# Patient Record
Sex: FEMALE | Race: BLACK OR AFRICAN AMERICAN | NOT HISPANIC OR LATINO | Employment: FULL TIME | URBAN - METROPOLITAN AREA
[De-identification: names, ages, dates, MRNs, and addresses within clinical notes are randomized per-mention and may not be internally consistent; named-entity substitution may affect disease eponyms.]

---

## 2020-02-10 ENCOUNTER — OFFICE VISIT (OUTPATIENT)
Dept: URGENT CARE | Facility: CLINIC | Age: 55
End: 2020-02-10
Payer: MEDICAID

## 2020-02-10 VITALS
SYSTOLIC BLOOD PRESSURE: 134 MMHG | WEIGHT: 243 LBS | OXYGEN SATURATION: 95 % | RESPIRATION RATE: 26 BRPM | DIASTOLIC BLOOD PRESSURE: 92 MMHG | TEMPERATURE: 98.1 F | HEART RATE: 110 BPM

## 2020-02-10 DIAGNOSIS — K58.9 IRRITABLE BOWEL SYNDROME, UNSPECIFIED TYPE: ICD-10-CM

## 2020-02-10 DIAGNOSIS — J45.20 MILD INTERMITTENT ASTHMA, UNSPECIFIED WHETHER COMPLICATED: ICD-10-CM

## 2020-02-10 DIAGNOSIS — B37.0 ORAL THRUSH: ICD-10-CM

## 2020-02-10 PROCEDURE — 99204 OFFICE O/P NEW MOD 45 MIN: CPT | Mod: 25 | Performed by: FAMILY MEDICINE

## 2020-02-10 PROCEDURE — 94640 AIRWAY INHALATION TREATMENT: CPT | Performed by: FAMILY MEDICINE

## 2020-02-10 RX ORDER — ALBUTEROL SULFATE 2.5 MG/3ML
2.5 SOLUTION RESPIRATORY (INHALATION) EVERY 6 HOURS PRN
Qty: 90 BULLET | Refills: 1 | Status: SHIPPED | OUTPATIENT
Start: 2020-02-10 | End: 2020-02-10

## 2020-02-10 RX ORDER — ALBUTEROL SULFATE 2.5 MG/3ML
2.5 SOLUTION RESPIRATORY (INHALATION) EVERY 6 HOURS PRN
Qty: 90 BULLET | Refills: 3 | Status: SHIPPED | OUTPATIENT
Start: 2020-02-10 | End: 2020-03-11

## 2020-02-10 RX ORDER — DICYCLOMINE HYDROCHLORIDE 10 MG/1
10 CAPSULE ORAL 3 TIMES DAILY
Qty: 90 CAP | Refills: 3 | Status: SHIPPED | OUTPATIENT
Start: 2020-02-10 | End: 2021-10-28 | Stop reason: SDUPTHER

## 2020-02-10 RX ORDER — DICYCLOMINE HYDROCHLORIDE 10 MG/1
CAPSULE ORAL
COMMUNITY
Start: 2019-11-25 | End: 2020-02-10 | Stop reason: SDUPTHER

## 2020-02-10 RX ORDER — ALBUTEROL SULFATE 2.5 MG/3ML
2.5 SOLUTION RESPIRATORY (INHALATION) ONCE
Status: COMPLETED | OUTPATIENT
Start: 2020-02-10 | End: 2020-02-10

## 2020-02-10 RX ORDER — DICYCLOMINE HYDROCHLORIDE 10 MG/1
10 CAPSULE ORAL 3 TIMES DAILY
Qty: 90 CAP | Refills: 1 | Status: SHIPPED | OUTPATIENT
Start: 2020-02-10 | End: 2020-02-10

## 2020-02-10 RX ORDER — ALBUTEROL SULFATE 2.5 MG/3ML
2.5 SOLUTION RESPIRATORY (INHALATION) EVERY 4 HOURS PRN
COMMUNITY
End: 2020-02-10 | Stop reason: SDUPTHER

## 2020-02-10 RX ADMIN — ALBUTEROL SULFATE 2.5 MG: 2.5 SOLUTION RESPIRATORY (INHALATION) at 17:26

## 2020-02-10 ASSESSMENT — ENCOUNTER SYMPTOMS
SHORTNESS OF BREATH: 0
FEVER: 0
NAUSEA: 0
VOMITING: 0
CHILLS: 0
EYE REDNESS: 0
MYALGIAS: 0
SORE THROAT: 0
DIZZINESS: 0

## 2020-02-11 NOTE — PROGRESS NOTES
Subjective:   Violetta Rodriguez is a 54 y.o. female who presents for Asthma and Medication Refill (nebulizer,nebulizer solution and dicyclomine)        54-year-old female with history of asthma presents to the urgent care with chief complaint of dyspnea and wheezing.  The patient has a history of mild persistent asthma and has been using a nebulizer twice a day on a chronic basis.  The patient recently relocated to the area and is in between primary care providers.  The patient states this morning the nebulizer machine attachment broke and the patient was unable to use the nebulizer and albuterol this morning.  Patient denies fevers chills or sweats, denies cough.  The patient has a history of a bowel syndrome and has been taking dicyclomine on a chronic basis and is requesting a refill.  Patient denies abdominal pain denies nausea or vomiting or loose stools at this time.  The patient complains of white exudative rash on the tongue which is worse with using the nebulizer.    Asthma   There is no shortness of breath. Pertinent negatives include no chest pain, fever, myalgias or sore throat. Her past medical history is significant for asthma.     PMH:  has no past medical history on file.  MEDS:   Current Outpatient Medications:   •  albuterol (PROVENTIL) 2.5mg/3ml Nebu Soln solution for nebulization, 3 mL by Nebulization route every 6 hours as needed for Shortness of Breath for up to 30 days., Disp: 90 Bullet, Rfl: 3  •  dicyclomine (BENTYL) 10 MG Cap, Take 1 Cap by mouth 3 times a day., Disp: 90 Cap, Rfl: 3  •  Respiratory Therapy Supplies (NEBULIZER) Device, 1 Units by Does not apply route every 6 hours., Disp: 1 Device, Rfl: 0  •  nystatin (MYCOSTATIN) 795904 UNIT/ML Suspension, Take 5 mL by mouth 4 times a day for 7 days., Disp: 140 mL, Rfl: 0  ALLERGIES:   Allergies   Allergen Reactions   • Amoxicillin Swelling   • Sulfa Drugs Swelling     SURGHX: No past surgical history on file.  SOCHX:  reports that she has never  smoked. She has never used smokeless tobacco.  FH: Family history was reviewed  Review of Systems   Constitutional: Negative for chills and fever.   HENT: Negative for sore throat.    Eyes: Negative for redness.   Respiratory: Negative for shortness of breath.    Cardiovascular: Negative for chest pain.   Gastrointestinal: Negative for nausea and vomiting.   Genitourinary: Negative for dysuria.   Musculoskeletal: Negative for myalgias.   Skin: Negative for rash.   Neurological: Negative for dizziness.   All other systems reviewed and are negative.       Objective:   /92 (BP Location: Left arm)   Pulse (!) 110   Temp 36.7 °C (98.1 °F) (Temporal)   Resp (!) 26   Wt 110.2 kg (243 lb)   LMP  (Exact Date)   SpO2 95%   Physical Exam  Vitals signs and nursing note reviewed.   Constitutional:       General: She is not in acute distress.     Appearance: She is well-developed.   HENT:      Head: Normocephalic and atraumatic.      Right Ear: External ear normal.      Left Ear: External ear normal.      Nose: Nose normal.      Mouth/Throat:      Mouth: Mucous membranes are moist.      Pharynx: Oropharynx is clear.     Eyes:      Conjunctiva/sclera: Conjunctivae normal.      Pupils: Pupils are equal, round, and reactive to light.   Cardiovascular:      Rate and Rhythm: Normal rate and regular rhythm.      Heart sounds: No murmur.   Pulmonary:      Effort: Pulmonary effort is normal. No tachypnea, prolonged expiration or respiratory distress.      Breath sounds: No decreased air movement. Wheezing (mild) present. No decreased breath sounds or rhonchi.   Abdominal:      General: There is no distension.      Palpations: Abdomen is soft.      Tenderness: There is no tenderness.   Musculoskeletal: Normal range of motion.   Skin:     General: Skin is warm and dry.   Neurological:      General: No focal deficit present.      Mental Status: She is alert and oriented to person, place, and time. Mental status is at baseline.         Gait: Gait (gait at baseline) normal.   Psychiatric:         Judgment: Judgment normal.     Medical decision-making/course: The patient remained afebrile, hemodynamically and neurologically stable with no evidence of respiratory compromise throughout the urgent care course.  Patient was given albuterol via hand-held nebulizer once with symptomatic improvement and decreased subjective dyspnea for the patient.  There was no immediate clinical indication for the necessity of emergency department evaluation or inpatient admission and the patient requested a trial of outpatient management.          Assessment/Plan:   1. Mild intermittent asthma, unspecified whether complicated  - albuterol (PROVENTIL) 2.5mg/3ml nebulizer solution 2.5 mg  - albuterol (PROVENTIL) 2.5mg/3ml Nebu Soln solution for nebulization; 3 mL by Nebulization route every 6 hours as needed for Shortness of Breath for up to 30 days.  Dispense: 90 Bullet; Refill: 3  - DME Nebulizer  - Respiratory Therapy Supplies (NEBULIZER) Device; 1 Units by Does not apply route every 6 hours.  Dispense: 1 Device; Refill: 0    2. Irritable bowel syndrome, unspecified type  - dicyclomine (BENTYL) 10 MG Cap; Take 1 Cap by mouth 3 times a day.  Dispense: 90 Cap; Refill: 3    3. Oral thrush  - nystatin (MYCOSTATIN) 958448 UNIT/ML Suspension; Take 5 mL by mouth 4 times a day for 7 days.  Dispense: 140 mL; Refill: 0    Advised patient to follow-up with her primary care provider for recheck reevaluation consideration of further management.    Discussed close monitoring, return precautions, and supportive measures including maintaining adequate fluid hydration and caloric intake, relative rest and OTC symptom management including acetaminophen as needed for pain and/or fever.    Differential diagnosis, natural history, supportive care, and indications for immediate follow-up discussed.     Advised the patient to follow-up with the primary care physician for recheck,  reevaluation, and consideration of further management.

## 2020-02-11 NOTE — PATIENT INSTRUCTIONS
Asthma, Acute Bronchospasm  Acute bronchospasm caused by asthma is also referred to as an asthma attack. Bronchospasm means your air passages become narrowed. The narrowing is caused by inflammation and tightening of the muscles in the air tubes (bronchi) in your lungs. This can make it hard to breathe or cause you to wheeze and cough.  What are the causes?  Possible triggers are:  · Animal dander from the skin, hair, or feathers of animals.  · Dust mites contained in house dust.  · Cockroaches.  · Pollen from trees or grass.  · Mold.  · Cigarette or tobacco smoke.  · Air pollutants such as dust, household , hair sprays, aerosol sprays, paint fumes, strong chemicals, or strong odors.  · Cold air or weather changes. Cold air may trigger inflammation. Winds increase molds and pollens in the air.  · Strong emotions such as crying or laughing hard.  · Stress.  · Certain medicines such as aspirin or beta-blockers.  · Sulfites in foods and drinks, such as dried fruits and wine.  · Infections or inflammatory conditions, such as a flu, cold, or inflammation of the nasal membranes (rhinitis).  · Gastroesophageal reflux disease (GERD). GERD is a condition where stomach acid backs up into your esophagus.  · Exercise or strenuous activity.  What are the signs or symptoms?  · Wheezing.  · Excessive coughing, particularly at night.  · Chest tightness.  · Shortness of breath.  How is this diagnosed?  Your health care provider will ask you about your medical history and perform a physical exam. A chest X-ray or blood testing may be performed to look for other causes of your symptoms or other conditions that may have triggered your asthma attack.  How is this treated?  Treatment is aimed at reducing inflammation and opening up the airways in your lungs. Most asthma attacks are treated with inhaled medicines. These include quick relief or rescue medicines (such as bronchodilators) and controller medicines (such as inhaled  corticosteroids). These medicines are sometimes given through an inhaler or a nebulizer. Systemic steroid medicine taken by mouth or given through an IV tube also can be used to reduce the inflammation when an attack is moderate or severe. Antibiotic medicines are only used if a bacterial infection is present.  Follow these instructions at home:  · Rest.  · Drink plenty of liquids. This helps the mucus to remain thin and be easily coughed up. Only use caffeine in moderation and do not use alcohol until you have recovered from your illness.  · Do not smoke. Avoid being exposed to secondhand smoke.  · You play a critical role in keeping yourself in good health. Avoid exposure to things that cause you to wheeze or to have breathing problems.  · Keep your medicines up-to-date and available. Carefully follow your health care provider’s treatment plan.  · Take your medicine exactly as prescribed.  · When pollen or pollution is bad, keep windows closed and use an air conditioner or go to places with air conditioning.  · Asthma requires careful medical care. See your health care provider for a follow-up as advised. If you are more than 24 weeks pregnant and you were prescribed any new medicines, let your obstetrician know about the visit and how you are doing. Follow up with your health care provider as directed.  · After you have recovered from your asthma attack, make an appointment with your outpatient doctor to talk about ways to reduce the likelihood of future attacks. If you do not have a doctor who manages your asthma, make an appointment with a primary care doctor to discuss your asthma.  Get help right away if:  · You are getting worse.  · You have trouble breathing. If severe, call your local emergency services (911 in the [place]  · You develop chest pain or discomfort.  · You are vomiting.  · You are not able to keep fluids down.  · You are coughing up yellow, green, brown, or bloody sputum.  · You have a fever  and your symptoms suddenly get worse.  · You have trouble swallowing.  This information is not intended to replace advice given to you by your health care provider. Make sure you discuss any questions you have with your health care provider.  Document Released: 04/03/2008 Document Revised: 05/31/2017 Document Reviewed: 06/25/2014  [employer] Interactive Patient Education © 2017 [employer] Inc.  Oral Thrush, Adult  Oral thrush is an infection in your mouth and throat. It causes white patches on your tongue and in your mouth.  Follow these instructions at home:  Helping with soreness  · To lessen your pain:  ¨ Drink cold liquids, like water and iced tea.  ¨ Eat frozen ice pops or frozen juices.  ¨ Eat foods that are easy to swallow, like gelatin and ice cream.  ¨ Drink from a straw if the patches in your mouth are painful.  General instructions  · Take or use over-the-counter and prescription medicines only as told by your doctor. Medicine for oral thrush may be something to swallow, or it may be something to put on the infected area.  · Eat plain yogurt that has live cultures in it. Read the label to make sure.  · If you wear dentures:  ¨ Take out your dentures before you go to bed.  ¨ Brush them well.  ¨ Soak them in a denture .  · Rinse your mouth with warm salt-water many times a day. To make the salt-water mixture, completely dissolve 1/2-1 teaspoon of salt in 1 cup of warm water.  Contact a doctor if:  · Your problems are getting worse.  · Your problems do not get better in less than 7 days with treatment.  · Your infection is spreading. This may show as white patches on the skin outside of your mouth.  · You are nursing your baby and you have redness and pain in the nipples.  This information is not intended to replace advice given to you by your health care provider. Make sure you discuss any questions you have with your health care provider.  Document Released: 03/14/2011 Document Revised: 09/11/2017  Document Reviewed: 09/11/2017  [employer] Interactive Patient Education © 2017 Elsevier Inc.

## 2021-10-28 ENCOUNTER — OFFICE VISIT (OUTPATIENT)
Dept: MEDICAL GROUP | Facility: CLINIC | Age: 56
End: 2021-10-28
Payer: MEDICAID

## 2021-10-28 VITALS
DIASTOLIC BLOOD PRESSURE: 92 MMHG | BODY MASS INDEX: 41.76 KG/M2 | SYSTOLIC BLOOD PRESSURE: 149 MMHG | HEART RATE: 83 BPM | WEIGHT: 235.7 LBS | OXYGEN SATURATION: 94 % | HEIGHT: 63 IN

## 2021-10-28 DIAGNOSIS — J30.9 ALLERGIC RHINITIS, UNSPECIFIED SEASONALITY, UNSPECIFIED TRIGGER: ICD-10-CM

## 2021-10-28 DIAGNOSIS — Z56.89 DIFFICULTY PERFORMING WORK ACTIVITIES: ICD-10-CM

## 2021-10-28 DIAGNOSIS — K58.8 OTHER IRRITABLE BOWEL SYNDROME: ICD-10-CM

## 2021-10-28 DIAGNOSIS — K58.9 IRRITABLE BOWEL SYNDROME, UNSPECIFIED TYPE: ICD-10-CM

## 2021-10-28 PROCEDURE — 99213 OFFICE O/P EST LOW 20 MIN: CPT | Mod: GE | Performed by: STUDENT IN AN ORGANIZED HEALTH CARE EDUCATION/TRAINING PROGRAM

## 2021-10-28 RX ORDER — DICYCLOMINE HYDROCHLORIDE 10 MG/1
10 CAPSULE ORAL 3 TIMES DAILY
Qty: 90 CAPSULE | Refills: 3 | Status: SHIPPED | OUTPATIENT
Start: 2021-10-28 | End: 2022-03-23 | Stop reason: SDUPTHER

## 2021-10-28 RX ORDER — FLUTICASONE PROPIONATE 50 MCG
1 SPRAY, SUSPENSION (ML) NASAL DAILY
COMMUNITY
End: 2021-10-28 | Stop reason: SDUPTHER

## 2021-10-28 RX ORDER — FLUTICASONE PROPIONATE 50 MCG
1 SPRAY, SUSPENSION (ML) NASAL DAILY
Qty: 16 G | Refills: 12 | Status: SHIPPED | OUTPATIENT
Start: 2021-10-28 | End: 2022-07-06 | Stop reason: SDUPTHER

## 2021-10-28 RX ORDER — DOCUSATE SODIUM 100 MG/1
CAPSULE, LIQUID FILLED ORAL
COMMUNITY
Start: 2021-10-23 | End: 2021-10-28 | Stop reason: SDUPTHER

## 2021-10-28 RX ORDER — DOCUSATE SODIUM 100 MG/1
100 CAPSULE, LIQUID FILLED ORAL 2 TIMES DAILY PRN
Qty: 60 CAPSULE | Refills: 12 | Status: SHIPPED | OUTPATIENT
Start: 2021-10-28 | End: 2022-07-06

## 2021-10-28 NOTE — LETTER
[facility]     October 28, 2021    Patient: Violetta Rodriguez   YOB: 1965   Date of Visit: 10/28/2021       To Whom It May Concern:    Violetta Rodriguez was seen and treated in our clinic on 10/28/2021. The patient requires a leave of absence due to knee pain that severely limits her ability to perform work duties, particularly standing. She is actively working on a solution to this problem, and will return when able.     Sincerely,     Christina Philip M.D.

## 2021-10-28 NOTE — PROGRESS NOTES
"Subjective:     CC: Difficulties performing work duties, knee pain, obesity the above chief complaint.    HPI:   Violetta presents today with the above chief complaint.    Problem   Difficulty Performing Work Activities    Works at [employer]. Has difficulty standing for her entire shift (9 hrs). Limited by knee pain. Previously seen by me for this. Has seen an orthopedic surgeon; BMI too high for knee replacement surgery. Must lose 20 lb prior to surgery. Requesting time off from work to work on weight loss and work on obtaining disability through outside provider. Looks forward to dietary changes, bike and treadmill. Has done extensive PT in past, and very much enjoys the exercise bike. Gets steroid injections twice yearly with orthopedics; due again in December of this year.      Ibs (Irritable Bowel Syndrome)    Present x several years. Requests refill of dicyclomine and docusate for this. Hx of good control with symptoms with this.      Allergic Rhinitis    Requests fluticasone refill for this.      Bmi 40.0-44.9, Adult ([facility])    See \"difficultly performing work duties.\"         Current Outpatient Medications Ordered in Epic   Medication Sig Dispense Refill   • dicyclomine (BENTYL) 10 MG Cap Take 1 Capsule by mouth 3 times a day. 90 Capsule 3   • docusate sodium (COLACE) 100 MG Cap Take 1 Capsule by mouth 2 times a day as needed for Constipation. 60 Capsule 12   • fluticasone (FLONASE) 50 MCG/ACT nasal spray Administer 1 Spray into affected nostril(S) every day. 16 g 12   • Respiratory Therapy Supplies (NEBULIZER) Device 1 Units by Does not apply route every 6 hours. 1 Device 0     No current Epic-ordered facility-administered medications on file.       Health Maintenance: not completed; will f/u @ next visit in 2-3 months    ROS:  Gen: no fevers/chills, no changes in weight  Eyes: no changes in vision  ENT: no sore throat, no hearing loss, no bloody nose  Pulm: no sob, no cough  CV: no chest pain, no palpitations  GI: " "no nausea/vomiting, no diarrhea  : no dysuria  MSk: no myalgias  Skin: no rash  Neuro: no headaches, no numbness/tingling  Heme/Lymph: no easy bruising      Objective:     Exam:  /92 (BP Location: Left arm, Patient Position: Sitting, BP Cuff Size: Adult)   Pulse 83   Ht 1.588 m (5' 2.5\")   Wt 107 kg (235 lb 11.2 oz)   SpO2 94%   BMI 42.42 kg/m²  Body mass index is 42.42 kg/m².    Gen: Alert and oriented, No apparent distress.  Obese.  Neck: Neck is supple without lymphadenopathy.  Lungs: Normal effort, CTA bilaterally, no wheezes, rhonchi, or rales  CV: Regular rate and rhythm. No murmurs, rubs, or gallops.  Ext: No clubbing, cyanosis, edema.  Bilateral knees with crepitus with any movement, as well as visible bony enlargement diffusely.      Labs: None    Assessment & Plan:     56 y.o. female with the following -     Problem List Items Addressed This Visit     Difficulty performing work activities     Work note provided today. RTC in 2-3 months to review symptoms and progress with weight loss.          Relevant Orders    CBC WITH DIFFERENTIAL    IBS (irritable bowel syndrome)     Refills provided today.          Relevant Medications    dicyclomine (BENTYL) 10 MG Cap    Other Relevant Orders    CBC WITH DIFFERENTIAL    Allergic rhinitis    Relevant Medications    fluticasone (FLONASE) 50 MCG/ACT nasal spray    BMI 40.0-44.9, adult (HCC)     F/u 2-3 months. Labs ordered today.          Relevant Orders    HEMOGLOBIN A1C    CHOLESTEROL    Comp Metabolic Panel    CBC WITH DIFFERENTIAL              No follow-ups on file.    Please note that this dictation was created using voice recognition software. I have made every reasonable attempt to correct obvious errors, but I expect that there are errors of grammar and possibly content that I did not discover before finalizing the note.        "

## 2021-10-28 NOTE — LETTER
[facility]     October 28, 2021    Patient: Violetta Rodriguez   YOB: 1965   Date of Visit: 10/28/2021       To Whom It May Concern:     Violetta Rodriguez was seen and treated in our clinic on 10/28/2021. The patient requires a leave of absence due to knee pain caused by bilateral, severe osteoarthritis of her knees with sclerosis and osteophytes that severely limits her ability to perform work duties, particularly standing. She is actively working on a solution to this problem, and will return when able.     Sincerely,     Christina Philip M.D.

## 2021-11-05 ENCOUNTER — OFFICE VISIT (OUTPATIENT)
Dept: MEDICAL GROUP | Facility: CLINIC | Age: 56
End: 2021-11-05
Payer: MEDICAID

## 2021-11-05 VITALS
HEIGHT: 63 IN | SYSTOLIC BLOOD PRESSURE: 130 MMHG | HEART RATE: 102 BPM | DIASTOLIC BLOOD PRESSURE: 84 MMHG | TEMPERATURE: 98 F | OXYGEN SATURATION: 95 % | WEIGHT: 236 LBS | BODY MASS INDEX: 41.82 KG/M2 | RESPIRATION RATE: 18 BRPM

## 2021-11-05 DIAGNOSIS — Z56.89 DIFFICULTY PERFORMING WORK ACTIVITIES: ICD-10-CM

## 2021-11-05 DIAGNOSIS — M17.10 ARTHRITIS OF KNEE: ICD-10-CM

## 2021-11-05 PROCEDURE — 7101 PR PHYSICAL: Mod: GE | Performed by: STUDENT IN AN ORGANIZED HEALTH CARE EDUCATION/TRAINING PROGRAM

## 2021-11-05 RX ORDER — IBUPROFEN 200 MG
400 TABLET ORAL EVERY 6 HOURS PRN
COMMUNITY

## 2021-11-05 RX ORDER — MELOXICAM 7.5 MG/1
TABLET ORAL
COMMUNITY
Start: 2021-10-24 | End: 2022-07-06

## 2021-11-05 RX ORDER — ESTRADIOL 0.1 MG/G
CREAM VAGINAL
COMMUNITY
Start: 2021-10-09 | End: 2022-02-19

## 2021-11-05 ASSESSMENT — PAIN SCALES - GENERAL: PAINLEVEL: 4=SLIGHT-MODERATE PAIN

## 2021-11-05 ASSESSMENT — PATIENT HEALTH QUESTIONNAIRE - PHQ9: CLINICAL INTERPRETATION OF PHQ2 SCORE: 0

## 2021-11-06 NOTE — ASSESSMENT & PLAN NOTE
FMLA paperwork signed  -Educated patient on weight loss to assist with symptoms  -Follow-up with [facility] orthopedics for further evaluation of knee replacement surgery

## 2021-11-06 NOTE — PROGRESS NOTES
[facility]    Chief Complaint   Patient presents with   • Follow-Up     Short term disability paperwork       HISTORY OF PRESENT ILLNESS: Patient is a 56 y.o. female established patient who presents today to discuss the medical issues below:    Problem   Arthritis of Knee    Per documentation provided by [facility] orthopedics, patient has bilateral osteoarthritis of the knees.   -Patient complains of severe knee pain with prolonged standing and walking.  -She currently uses a walker to assist.     Difficulty Performing Work Activities    Works at [employer]. Has difficulty standing for her entire shift (9 hrs). Limited by knee pain. Previously seen by me for this. Has seen an orthopedic surgeon; BMI too high for knee replacement surgery. Must lose 20 lb prior to surgery. Requesting time off from work to work on weight loss and work on obtaining disability through outside provider. Looks forward to dietary changes, bike and treadmill. Has done extensive PT in past, and very much enjoys the exercise bike. Gets steroid injections twice yearly with orthopedics; due again in December of this year.           Patient Active Problem List    Diagnosis Date Noted   • Arthritis of knee 11/05/2021   • Difficulty performing work activities 10/28/2021   • IBS (irritable bowel syndrome) 10/28/2021   • Allergic rhinitis 10/28/2021   • BMI 40.0-44.9, adult ([facility]) 10/28/2021       Allergies:Amoxicillin and Sulfa drugs    Current Outpatient Medications   Medication Sig Dispense Refill   • ibuprofen (MOTRIN) 200 MG Tab Take 400 mg by mouth every 6 hours as needed.     • estradiol (ESTRACE) 0.1 MG/GM vaginal cream      • meloxicam (MOBIC) 7.5 MG Tab      • dicyclomine (BENTYL) 10 MG Cap Take 1 Capsule by mouth 3 times a day. 90 Capsule 3   • docusate sodium (COLACE) 100 MG Cap Take 1 Capsule by mouth 2 times a day as needed for Constipation. 60 Capsule 12   • fluticasone (FLONASE) 50 MCG/ACT nasal spray Administer 1 Spray into  "affected nostril(S) every day. 16 g 12   • Respiratory Therapy Supplies (NEBULIZER) Device 1 Units by Does not apply route every 6 hours. 1 Device 0     No current facility-administered medications for this visit.         No past medical history on file.    Social History     Tobacco Use   • Smoking status: Never Smoker   • Smokeless tobacco: Never Used   Vaping Use   • Vaping Use: Never used   Substance Use Topics   • Alcohol use: Not on file   • Drug use: Not on file       No family status information on file.   No family history on file.    ROS:  Negative except as stated above.      Exam:   /84 (BP Location: Left arm, Patient Position: Sitting, BP Cuff Size: Adult)   Pulse (!) 102   Temp 36.7 °C (98 °F) (Temporal)   Resp 18   Ht 1.6 m (5' 3\")   Wt 107 kg (236 lb)   SpO2 95%  Body mass index is 41.81 kg/m².  General:  Well nourished, well developed female in NAD.  HENT: Normocephalic  Pulmonary: Clear to ausculation.  Normal effort. No rales, rhonchi, or wheezing.  Cardiovascular: Regular rate and rhythm without murmur.   Abdomen: Normal bowel sounds, soft and nontender, no palpable liver, spleen, or masses.  Extremities: No LE edema noted. 5/5 strength in all extremities.  Tenderness palpation of bilateral knees.  Crepitus noted with flexion and extension of bilateral knees.  Neuro: Grossly nonfocal.  Psych: Alert and oriented to person, place, and time. Appropriate mood and conversation.            Assessment/Plan:  Difficulty performing work activities  Patient presents today for [facility] paperwork to be signed.  -[facility] paperwork filled out  -Follow-up with orthopedic surgery  -Follow-up with Dr. Philip in 1 month    Arthritis of knee  [facility] paperwork signed  -Educated patient on weight loss to assist with symptoms  -Follow-up with [facility] orthopedics for further evaluation of knee replacement surgery           Jose Solitario,   R   PGY-2    "

## 2021-11-06 NOTE — ASSESSMENT & PLAN NOTE
Patient presents today for [facility] paperwork to be signed.  -[facility] paperwork filled out  -Follow-up with orthopedic surgery  -Follow-up with Dr. Philip in 1 month

## 2021-12-06 ENCOUNTER — TELEPHONE (OUTPATIENT)
Dept: MEDICAL GROUP | Facility: CLINIC | Age: 56
End: 2021-12-06

## 2021-12-06 NOTE — TELEPHONE ENCOUNTER
Unclear exactly which practice this is. Most importantly, does she require ophthalmology or optometry referral?    Caller Name: Violetta  Call Back Number: [phone] (home) [phone] (work)      How would the patient prefer to be contacted with a response: [employer] message    Patient called asking for a referral to [facility] for dry eye.

## 2021-12-14 ENCOUNTER — TELEPHONE (OUTPATIENT)
Dept: SCHEDULING | Facility: IMAGING CENTER | Age: 56
End: 2021-12-14

## 2021-12-14 DIAGNOSIS — H04.129 DRY EYE: ICD-10-CM

## 2021-12-14 NOTE — TELEPHONE ENCOUNTER
I talked to pt and she needs referral to opthalmology so that she can go to [facility]. She talked with Medicaid and they need the referral to be ophthalmology not optometry. Their fax is [phone]. I will fax the referral to them once you place it.

## 2022-01-03 ENCOUNTER — APPOINTMENT (OUTPATIENT)
Dept: MEDICAL GROUP | Facility: CLINIC | Age: 57
End: 2022-01-03
Payer: MEDICAID

## 2022-01-11 ENCOUNTER — HOSPITAL ENCOUNTER (OUTPATIENT)
Dept: RADIOLOGY | Facility: MEDICAL CENTER | Age: 57
End: 2022-01-11
Attending: STUDENT IN AN ORGANIZED HEALTH CARE EDUCATION/TRAINING PROGRAM
Payer: MEDICAID

## 2022-01-11 ENCOUNTER — OFFICE VISIT (OUTPATIENT)
Dept: MEDICAL GROUP | Facility: CLINIC | Age: 57
End: 2022-01-11

## 2022-01-11 ENCOUNTER — APPOINTMENT (OUTPATIENT)
Dept: LAB | Facility: MEDICAL CENTER | Age: 57
End: 2022-01-11
Payer: MEDICAID

## 2022-01-11 ENCOUNTER — APPOINTMENT (OUTPATIENT)
Dept: RADIOLOGY | Facility: CLINIC | Age: 57
End: 2022-01-11
Attending: STUDENT IN AN ORGANIZED HEALTH CARE EDUCATION/TRAINING PROGRAM
Payer: MEDICAID

## 2022-01-11 VITALS
WEIGHT: 221 LBS | OXYGEN SATURATION: 94 % | DIASTOLIC BLOOD PRESSURE: 93 MMHG | HEIGHT: 63 IN | BODY MASS INDEX: 39.16 KG/M2 | SYSTOLIC BLOOD PRESSURE: 147 MMHG | TEMPERATURE: 97 F | HEART RATE: 80 BPM | RESPIRATION RATE: 18 BRPM

## 2022-01-11 DIAGNOSIS — M17.12 ARTHRITIS OF LEFT KNEE: ICD-10-CM

## 2022-01-11 PROCEDURE — 93000 ELECTROCARDIOGRAM COMPLETE: CPT | Mod: GC | Performed by: STUDENT IN AN ORGANIZED HEALTH CARE EDUCATION/TRAINING PROGRAM

## 2022-01-11 PROCEDURE — 71046 X-RAY EXAM CHEST 2 VIEWS: CPT

## 2022-01-11 PROCEDURE — 99213 OFFICE O/P EST LOW 20 MIN: CPT | Mod: GE | Performed by: STUDENT IN AN ORGANIZED HEALTH CARE EDUCATION/TRAINING PROGRAM

## 2022-01-11 ASSESSMENT — PATIENT HEALTH QUESTIONNAIRE - PHQ9: CLINICAL INTERPRETATION OF PHQ2 SCORE: 0

## 2022-01-11 NOTE — ASSESSMENT & PLAN NOTE
I have performed an EKG and the patient in clinic today, which appears normal. Sinus rhythm, asymptomatic, , QRS 92, QTc 425, normal axis, no concerning ST or t wave changes.     I have also provided the patient with lab slips for the lab work as requested by her orthopedic surgeon.  Review of lab work performed shows PT, PTT within normal limits.  Complete metabolic panel is within normal limits.  CBC is generally within normal limits, though the patient does have low normal hemoglobin with decreased MCH and MCHC values, as well as mild elevation of platelets.  Hemoglobin A1c is 5.9%.    No h/o concerning pulmonary history, no cough, no dyspnea, no sputum. No h/o chronic lung disease. No chest xray necessary prior to clearance.     She is medically cleared from my standpoint for surgery.     Dr Vela fax Attkaryna Dejesus: [phone]

## 2022-01-13 DIAGNOSIS — R93.89 OPACITY NOTED ON IMAGING STUDY: ICD-10-CM

## 2022-01-13 NOTE — TELEPHONE ENCOUNTER
Looks like a referral to ophthalmology was placed. I called and lvm with pt to cb if she has any questions or concerns

## 2022-01-18 NOTE — PROGRESS NOTES
"Subjective:     CC: Preoperative clearance for left knee total arthroplasty February 15, 2022    HPI:   Violetta presents today with the above chief complaint    Problem   Arthritis of Left Knee    Patient has been seen by Dr. Vela for arthritis of her knees with plans for left knee total arthroplasty February 15, 2022.  He has requested a letter of medical clearance for the patient, as well as EKG and lab work (CBC, CMP, PT, PTT, A1c) to be performed in advance of her procedure.         Current Outpatient Medications Ordered in Epic   Medication Sig Dispense Refill   • estradiol (ESTRACE) 0.1 MG/GM vaginal cream      • meloxicam (MOBIC) 7.5 MG Tab      • ibuprofen (MOTRIN) 200 MG Tab Take 400 mg by mouth every 6 hours as needed.     • dicyclomine (BENTYL) 10 MG Cap Take 1 Capsule by mouth 3 times a day. 90 Capsule 3   • docusate sodium (COLACE) 100 MG Cap Take 1 Capsule by mouth 2 times a day as needed for Constipation. 60 Capsule 12   • fluticasone (FLONASE) 50 MCG/ACT nasal spray Administer 1 Spray into affected nostril(S) every day. 16 g 12   • Respiratory Therapy Supplies (NEBULIZER) Device 1 Units by Does not apply route every 6 hours. 1 Device 0     No current Epic-ordered facility-administered medications on file.       Health Maintenance: Deferred    ROS:  Gen: no fevers/chills; does report intentional weight loss recently, and is pleased about this.  Has lost weight through exercise (stationary bicycle).  Eyes: no changes in vision  ENT: no sore throat, no hearing loss, no bloody nose  Pulm: no sob, no cough  CV: no chest pain, no palpitations  GI: no nausea/vomiting, no diarrhea  : no dysuria  MSk: Chronic bilateral knee pain secondary to arthritis  Skin: no rash  Neuro: no headaches, no numbness/tingling  Heme/Lymph: no easy bruising      Objective:     Exam:  /93   Pulse 80   Temp 36.1 °C (97 °F)   Resp 18   Ht 1.6 m (5' 3\")   Wt 100 kg (221 lb)   SpO2 94%   BMI 39.15 kg/m²  Body mass index " is 39.15 kg/m².    Gen: Alert and oriented, No apparent distress.  Obese.  Neck: Neck is supple without lymphadenopathy.  Lungs: Normal effort, CTA bilaterally, no wheezes, rhonchi, or rales  CV: Regular rate and rhythm. No murmurs, rubs, or gallops.  Ext: No clubbing, cyanosis, edema.        Assessment & Plan:     56 y.o. female with the following -     Problem List Items Addressed This Visit     Arthritis of left knee     I have performed an EKG and the patient in clinic today, which appears normal. Sinus rhythm, asymptomatic, , QRS 92, QTc 425, normal axis, no concerning ST or t wave changes.     I have also provided the patient with lab slips for the lab work as requested by her orthopedic surgeon. **    No h/o concerning pulmonary history, no cough, no dyspnea, no sputum. No h/o chronic lung disease. No chest xray necessary prior to clearance.     She is medically cleared from my standpoint for surgery.     Dr Vela fax Attn Anjelica: [phone]         Relevant Orders    DX-CHEST-2 VIEWS (Completed)      Other Visit Diagnoses     BMI 39.0-39.9,adult        Relevant Orders    CBC WITH DIFFERENTIAL    Comp Metabolic Panel    APTT    Prothrombin Time    HEMOGLOBIN A1C    EKG - Clinic Performed (Completed)    DX-CHEST-2 VIEWS (Completed)              No follow-ups on file.

## 2022-01-28 ENCOUNTER — HOSPITAL ENCOUNTER (OUTPATIENT)
Dept: RADIOLOGY | Facility: MEDICAL CENTER | Age: 57
End: 2022-01-28
Attending: STUDENT IN AN ORGANIZED HEALTH CARE EDUCATION/TRAINING PROGRAM
Payer: MEDICAID

## 2022-01-28 DIAGNOSIS — R93.89 OPACITY NOTED ON IMAGING STUDY: ICD-10-CM

## 2022-01-28 PROCEDURE — 71260 CT THORAX DX C+: CPT

## 2022-01-28 PROCEDURE — 700117 HCHG RX CONTRAST REV CODE 255: Performed by: STUDENT IN AN ORGANIZED HEALTH CARE EDUCATION/TRAINING PROGRAM

## 2022-01-28 RX ADMIN — IOHEXOL 75 ML: 350 INJECTION, SOLUTION INTRAVENOUS at 10:14

## 2022-02-02 ENCOUNTER — TELEPHONE (OUTPATIENT)
Dept: MEDICAL GROUP | Facility: CLINIC | Age: 57
End: 2022-02-02

## 2022-02-02 NOTE — TELEPHONE ENCOUNTER
Phone Number Called: [phone] (home)       Call outcome: Spoke to patient regarding message below.    Message: Called patient regarding her imaging results, she would like to know if this is going to interfere with her upcoming surgery with Dr. Vela. She would also like to know if we should be faxing any of this information over to him    Thank you!

## 2022-02-02 NOTE — TELEPHONE ENCOUNTER
Please disregard the message below, I read your other note stating this should not affect her surgery

## 2022-02-07 ENCOUNTER — HOME HEALTH ADMISSION (OUTPATIENT)
Dept: HOME HEALTH SERVICES | Facility: HOME HEALTHCARE | Age: 57
End: 2022-02-07
Payer: MEDICAID

## 2022-02-19 ENCOUNTER — HOME CARE VISIT (OUTPATIENT)
Dept: HOME HEALTH SERVICES | Facility: HOME HEALTHCARE | Age: 57
End: 2022-02-19
Payer: MEDICAID

## 2022-02-19 PROCEDURE — G0493 RN CARE EA 15 MIN HH/HOSPICE: HCPCS

## 2022-02-19 PROCEDURE — 665001 SOC-HOME HEALTH

## 2022-02-19 ASSESSMENT — ENCOUNTER SYMPTOMS
SUBJECTIVE PAIN PROGRESSION: GRADUALLY IMPROVING
HIGHEST PAIN SEVERITY IN PAST 24 HOURS: 6/10
PAIN LOCATION - RELIEVING FACTORS: RESTING, PAIN PILL
PAIN SEVERITY GOAL: 0/10
PAIN LOCATION: LEFT KNEE
PAIN LOCATION - PAIN FREQUENCY: CONSTANT
PAIN: 1
PERSON REPORTING PAIN: PATIENT
PAIN LOCATION - EXACERBATING FACTORS: WALKING
LOWEST PAIN SEVERITY IN PAST 24 HOURS: 6/10
PAIN LOCATION - PAIN QUALITY: ACHY
PAIN LOCATION - PAIN SEVERITY: 6/10

## 2022-02-19 ASSESSMENT — PAIN SCALES - PAIN ASSESSMENT IN ADVANCED DEMENTIA (PAINAD)
FACIALEXPRESSION: 0 - SMILING OR INEXPRESSIVE.
NEGVOCALIZATION: 0 - NONE.
CONSOLABILITY: 0 - NO NEED TO CONSOLE.

## 2022-02-20 VITALS
TEMPERATURE: 97.6 F | WEIGHT: 220 LBS | HEIGHT: 63 IN | RESPIRATION RATE: 19 BRPM | DIASTOLIC BLOOD PRESSURE: 98 MMHG | BODY MASS INDEX: 38.98 KG/M2 | OXYGEN SATURATION: 98 % | SYSTOLIC BLOOD PRESSURE: 136 MMHG | HEART RATE: 106 BPM

## 2022-02-20 ASSESSMENT — ENCOUNTER SYMPTOMS: MENTAL STATUS CHANGE: 0

## 2022-02-20 ASSESSMENT — PATIENT HEALTH QUESTIONNAIRE - PHQ9
2. FEELING DOWN, DEPRESSED, IRRITABLE, OR HOPELESS: 00
1. LITTLE INTEREST OR PLEASURE IN DOING THINGS: 00
CLINICAL INTERPRETATION OF PHQ2 SCORE: 0

## 2022-02-20 ASSESSMENT — PAIN SCALES - PAIN ASSESSMENT IN ADVANCED DEMENTIA (PAINAD)
TOTALSCORE: 1
BODYLANGUAGE: 1 - TENSE. DISTRESSED PACING. FIDGETING.

## 2022-02-20 NOTE — CASE COMMUNICATION
for review please  Admitted to [facility] onn2/19/2022 s.p L knee arthoplasty  /98( checked 2x), hr 106, we will cont to monitor and notify you

## 2022-02-20 NOTE — CASE COMMUNICATION
admitted 2/19/2022 to home care [id number].  sn 1 wk1, 2 wk2, 1 wk2  she is scheduled to start out patient PT pn 3/07/2022 at [facility]

## 2022-02-22 ENCOUNTER — DOCUMENTATION (OUTPATIENT)
Dept: VASCULAR LAB | Facility: MEDICAL CENTER | Age: 57
End: 2022-02-22
Payer: MEDICAID

## 2022-02-22 ENCOUNTER — HOME CARE VISIT (OUTPATIENT)
Dept: HOME HEALTH SERVICES | Facility: HOME HEALTHCARE | Age: 57
End: 2022-02-22
Payer: MEDICAID

## 2022-02-22 VITALS
SYSTOLIC BLOOD PRESSURE: 130 MMHG | RESPIRATION RATE: 19 BRPM | DIASTOLIC BLOOD PRESSURE: 75 MMHG | TEMPERATURE: 97.9 F | OXYGEN SATURATION: 95 % | HEART RATE: 104 BPM

## 2022-02-22 PROCEDURE — G0299 HHS/HOSPICE OF RN EA 15 MIN: HCPCS

## 2022-02-22 PROCEDURE — G0151 HHCP-SERV OF PT,EA 15 MIN: HCPCS

## 2022-02-22 SDOH — ECONOMIC STABILITY: HOUSING INSECURITY: HOME SAFETY: RECOMMENDED GRAB BARS IN SHOWER  PATIENT HAS A SHOWER CHAIR AND 3 IN 1 COMMODE

## 2022-02-22 ASSESSMENT — ACTIVITIES OF DAILY LIVING (ADL)
AMBULATION_DISTANCE/DURATION_TOLERATED: 80 FEET
AMBULATION ASSISTANCE ON FLAT SURFACES: 1

## 2022-02-22 ASSESSMENT — ENCOUNTER SYMPTOMS
SUBJECTIVE PAIN PROGRESSION: GRADUALLY IMPROVING
PAIN LOCATION - PAIN QUALITY: ACHY, SHARP
HIGHEST PAIN SEVERITY IN PAST 24 HOURS: 6/10
PAIN LOCATION - EXACERBATING FACTORS: ROM, ACTIVITY
PAIN LOCATION - PAIN SEVERITY: 5/10
PAIN LOCATION - PAIN DURATION: CHRONIC
LIMITED RANGE OF MOTION: 1
PAIN LOCATION: LEFT KNEE
LOWEST PAIN SEVERITY IN PAST 24 HOURS: 0/10
PERSON REPORTING PAIN: PATIENT
MUSCLE WEAKNESS: 1
PAIN: 1
MENTAL STATUS CHANGE: 0
PAIN SEVERITY GOAL: 0/10
PAIN LOCATION - PAIN FREQUENCY: FREQUENT
ARTHRALGIAS: 1

## 2022-02-22 NOTE — PROGRESS NOTES
Medication chart review for [facility] services    PCP:  Christina Philip M.D.  [address]  [place] [address]  Fax: [phone]    Current medication list     Current Outpatient Medications:   •  aspirin, 162 mg, Oral, BID  •  acetaminophen, 1,000 mg, Oral, Q6HRS PRN  •  meloxicam,   •  ibuprofen, 400 mg, Oral, Q6HRS PRN  •  dicyclomine, 10 mg, Oral, TID  •  docusate sodium, 100 mg, Oral, BID PRN  •  fluticasone, 1 Spray, Nasal, DAILY  •  Nebulizer, 1 Units, Does not apply, Q6HRS    Allergies   Allergen Reactions   • Amoxicillin Swelling   • Sulfa Drugs Swelling       Labs   No results found for: SODIUM, POTASSIUM, CHLORIDE, CO2, GLUCOSE, BUN, CREATININE, BUNCREATRAT, GLOMRATE  No results found for: ALKPHOSPHAT, ASTSGOT, ALTSGPT, TBILIRUBIN, INR, ALBUMIN     Assessment and Plan:   • Received referral from [facility]. Medications reviewed.     Duplicate Therapy: ibuprofen, meloxicam  NON-STEROIDAL ANTI-INFLAMMATORY AGENTS. No Abuse/Dependency Potential.    Drug-Drug: aspirin and ibuprofen  Regular use of NSAIDs may decrease the antiplatelet effects of Salicylates. Reduced antiplatelet efficacy in patients with underlying cardiovascular risk may occur. Additionally, the potential for gastrointestinal side effects, including bleeding, may be increased with regular use of full-dose or low-dose aspirin.  Filtered by system settings    Drug-Drug: aspirin and meloxicam  Regular use of NSAIDs may decrease the antiplatelet effects of aspirin. Reduced antiplatelet efficacy in patients with underlying cardiovascular risk may occur. Additionally, concomitant use of NSAIDs and aspirin may increase the risk of GI toxicity.          Hector Hernandez, PharmD, MS, BCACP, C  [facility]  Phone [phone] fax [phone]    This note was created using voice recognition software (Dragon). The accuracy of the dictation is limited by the abilities of the software. I have reviewed the  note prior to signing, however some errors in grammar and context are still possible. If you have any questions related to this note please do not hesitate to contact our office.

## 2022-02-23 ENCOUNTER — TELEPHONE (OUTPATIENT)
Dept: MEDICAL GROUP | Facility: CLINIC | Age: 57
End: 2022-02-23

## 2022-02-23 VITALS
DIASTOLIC BLOOD PRESSURE: 98 MMHG | SYSTOLIC BLOOD PRESSURE: 132 MMHG | TEMPERATURE: 97.4 F | RESPIRATION RATE: 18 BRPM | HEART RATE: 104 BPM | OXYGEN SATURATION: 98 %

## 2022-02-23 ASSESSMENT — ENCOUNTER SYMPTOMS
PAIN LOCATION: LEFT KNEE
PAIN SEVERITY GOAL: 5/10
PAIN: 1
PAIN LOCATION - PAIN QUALITY: ACHY
SUBJECTIVE PAIN PROGRESSION: GRADUALLY IMPROVING
PAIN LOCATION - PAIN SEVERITY: 5/10
PAIN LOCATION - PAIN FREQUENCY: CONSTANT
HIGHEST PAIN SEVERITY IN PAST 24 HOURS: 5/10
LOWEST PAIN SEVERITY IN PAST 24 HOURS: 5/10
PERSON REPORTING PAIN: PATIENT
LIMITED RANGE OF MOTION: 1

## 2022-02-23 ASSESSMENT — PAIN SCALES - PAIN ASSESSMENT IN ADVANCED DEMENTIA (PAINAD)
NEGVOCALIZATION: 0 - NONE.
BODYLANGUAGE: 0 - RELAXED.
FACIALEXPRESSION: 0 - SMILING OR INEXPRESSIVE.
CONSOLABILITY: 0 - NO NEED TO CONSOLE.
TOTALSCORE: 0

## 2022-02-23 NOTE — CASE COMMUNICATION
fyi, Patient's pulse rate at 104 bpm is outside of normal parameters and is to be reported to MD and CM. Patient denies any dizziness, light headedness, chest pain.  All other vitals are within HH parameters.  Patient had the same pulse rate with the last 2 RN visits as well.

## 2022-02-23 NOTE — CASE COMMUNICATION
PT completed evaluation on 2/22/22.  Frequency 2 week 1, 3 week 1 effective 2/22/22.  Please assist with authorization as needed.

## 2022-02-24 ENCOUNTER — HOME CARE VISIT (OUTPATIENT)
Dept: HOME HEALTH SERVICES | Facility: HOME HEALTHCARE | Age: 57
End: 2022-02-24
Payer: MEDICAID

## 2022-02-24 PROCEDURE — G0493 RN CARE EA 15 MIN HH/HOSPICE: HCPCS

## 2022-02-25 ENCOUNTER — HOME CARE VISIT (OUTPATIENT)
Dept: HOME HEALTH SERVICES | Facility: HOME HEALTHCARE | Age: 57
End: 2022-02-25
Payer: MEDICAID

## 2022-02-25 VITALS
OXYGEN SATURATION: 92 % | HEART RATE: 113 BPM | SYSTOLIC BLOOD PRESSURE: 128 MMHG | RESPIRATION RATE: 16 BRPM | TEMPERATURE: 98.4 F | DIASTOLIC BLOOD PRESSURE: 66 MMHG

## 2022-02-25 PROCEDURE — G0151 HHCP-SERV OF PT,EA 15 MIN: HCPCS

## 2022-02-26 VITALS
TEMPERATURE: 98 F | DIASTOLIC BLOOD PRESSURE: 70 MMHG | RESPIRATION RATE: 18 BRPM | HEART RATE: 100 BPM | OXYGEN SATURATION: 97 % | SYSTOLIC BLOOD PRESSURE: 135 MMHG

## 2022-02-26 ASSESSMENT — ENCOUNTER SYMPTOMS
PERSON REPORTING PAIN: PATIENT
SUBJECTIVE PAIN PROGRESSION: GRADUALLY IMPROVING
HIGHEST PAIN SEVERITY IN PAST 24 HOURS: 5/10
PAIN: 1
DEBILITATING PAIN: 1
VOMITING: DENIES
PAIN SEVERITY GOAL: 0/10
LOWEST PAIN SEVERITY IN PAST 24 HOURS: 0/10
NAUSEA: DENIES

## 2022-02-27 ASSESSMENT — ENCOUNTER SYMPTOMS
LOWEST PAIN SEVERITY IN PAST 24 HOURS: 1/10
HIGHEST PAIN SEVERITY IN PAST 24 HOURS: 4/10
PAIN LOCATION - EXACERBATING FACTORS: END RANGES OF MOTION
SUBJECTIVE PAIN PROGRESSION: GRADUALLY IMPROVING
PAIN LOCATION - PAIN DURATION: SINCE SURGERY
PAIN LOCATION: LEFT KNEE
PERSON REPORTING PAIN: PATIENT
PAIN LOCATION - PAIN SEVERITY: 2/10
PAIN LOCATION - PAIN FREQUENCY: FREQUENT
PAIN SEVERITY GOAL: 0/10
PAIN: 1

## 2022-02-27 ASSESSMENT — ACTIVITIES OF DAILY LIVING (ADL)
AMBULATION ASSISTANCE ON FLAT SURFACES: 1
AMBULATION_DISTANCE/DURATION_TOLERATED: 30 FT

## 2022-02-28 ENCOUNTER — HOME CARE VISIT (OUTPATIENT)
Dept: HOME HEALTH SERVICES | Facility: HOME HEALTHCARE | Age: 57
End: 2022-02-28
Payer: MEDICAID

## 2022-02-28 ASSESSMENT — ACTIVITIES OF DAILY LIVING (ADL): OASIS_M1830: 05

## 2022-02-28 NOTE — CASE COMMUNICATION
"Quality Review Completed for 2/19 SOC OASIS by NIGEL Villalta RN on 2/28/2022:  Edits completed by NIGEL Villalta RN:  1.  changed to 2/19 for LSOC ordered  2. Per narrative for homebound status \" needs assistance to leave home, needs assistive devices to ambulate, unsafe to drive or leave residence without assistance, unsteady gait and weakness and fatigue \", changed the following for safety: , 1810, 1820, all #1, 1840 is #2, 1845 is  #1,  is #5,  is #2,  is #3, patient ambulated 80' with PT, has a 3 in 1 and no grab bars for safety.  changed to 2.22 for collaboration  3.  changed to newly epithelialized due to surgical wounds healing by primary intention in the first 30 days do not granulate per the guideline  4.  changed to 3, per guidelines when ambulation is with supervision only    5.  F is #1 per narrative for CG assist  6. M 2200 changed to 5 per therapy  7. [id number] is NA for stairs prior ability  8. [id number] changed to set up assist for B, C, F, supervision to G,   9. [id number] changed to supervision to D, E, F, I, per narrative for safety issues in HB status  10. Added ambulate only w/assist, proper med useand F2F to 485 forms  11. Resolved duplicate Med management off care plan.    "

## 2022-03-01 ENCOUNTER — OFFICE VISIT (OUTPATIENT)
Dept: MEDICAL GROUP | Facility: CLINIC | Age: 57
End: 2022-03-01
Payer: MEDICAID

## 2022-03-01 ENCOUNTER — HOME CARE VISIT (OUTPATIENT)
Dept: HOME HEALTH SERVICES | Facility: HOME HEALTHCARE | Age: 57
End: 2022-03-01
Payer: MEDICAID

## 2022-03-01 VITALS
RESPIRATION RATE: 16 BRPM | HEART RATE: 103 BPM | DIASTOLIC BLOOD PRESSURE: 100 MMHG | SYSTOLIC BLOOD PRESSURE: 160 MMHG | OXYGEN SATURATION: 97 % | TEMPERATURE: 97.7 F

## 2022-03-01 VITALS
HEART RATE: 104 BPM | HEIGHT: 63 IN | BODY MASS INDEX: 40.22 KG/M2 | DIASTOLIC BLOOD PRESSURE: 81 MMHG | OXYGEN SATURATION: 91 % | SYSTOLIC BLOOD PRESSURE: 131 MMHG | WEIGHT: 227 LBS

## 2022-03-01 DIAGNOSIS — M17.12 ARTHRITIS OF LEFT KNEE: ICD-10-CM

## 2022-03-01 DIAGNOSIS — R93.2 ABNORMAL FINDING ON IMAGING OF LIVER: ICD-10-CM

## 2022-03-01 PROCEDURE — 99213 OFFICE O/P EST LOW 20 MIN: CPT | Mod: GE | Performed by: STUDENT IN AN ORGANIZED HEALTH CARE EDUCATION/TRAINING PROGRAM

## 2022-03-01 PROCEDURE — G0299 HHS/HOSPICE OF RN EA 15 MIN: HCPCS

## 2022-03-01 RX ORDER — TRAMADOL HYDROCHLORIDE 50 MG/1
TABLET ORAL
COMMUNITY
Start: 2022-02-24 | End: 2022-07-06

## 2022-03-01 RX ORDER — OXYCODONE HYDROCHLORIDE 5 MG/1
TABLET ORAL
COMMUNITY
Start: 2022-02-07 | End: 2022-07-06

## 2022-03-01 ASSESSMENT — ENCOUNTER SYMPTOMS
LIMITED RANGE OF MOTION: 1
LOWEST PAIN SEVERITY IN PAST 24 HOURS: 4/10
VOMITING: PT DENIES ANY EMESIS AT THIS TIME
PAIN SEVERITY GOAL: 0/10
MENTAL STATUS CHANGE: 0
PAIN: 1
PERSON REPORTING PAIN: PATIENT
PAIN LOCATION - PAIN QUALITY: THROBING
PAIN LOCATION - EXACERBATING FACTORS: ACTIVITY
HIGHEST PAIN SEVERITY IN PAST 24 HOURS: 5/10
PAIN LOCATION - PAIN SEVERITY: 4/10
SUBJECTIVE PAIN PROGRESSION: GRADUALLY IMPROVING
PAIN LOCATION - RELIEVING FACTORS: REST
PAIN LOCATION - PAIN FREQUENCY: FREQUENT
PAIN LOCATION: LEFT KNEE
NAUSEA: PT DENIES ANY NAUSEA AT THIS TIME

## 2022-03-01 NOTE — LETTER
March 1, 2022    [employer]er of [employer]:    Please allow this patient to return to work on May 10, 2022.  She will require the assistance of a cane for walking.     If you have any questions, please feel free to contact me at [phone].    Sincerely,  MD Christina Garibay M.D.

## 2022-03-02 ENCOUNTER — HOME CARE VISIT (OUTPATIENT)
Dept: HOME HEALTH SERVICES | Facility: HOME HEALTHCARE | Age: 57
End: 2022-03-02
Payer: MEDICAID

## 2022-03-02 VITALS
RESPIRATION RATE: 17 BRPM | TEMPERATURE: 98.2 F | DIASTOLIC BLOOD PRESSURE: 62 MMHG | OXYGEN SATURATION: 93 % | HEART RATE: 103 BPM | SYSTOLIC BLOOD PRESSURE: 120 MMHG

## 2022-03-02 PROCEDURE — G0151 HHCP-SERV OF PT,EA 15 MIN: HCPCS

## 2022-03-02 NOTE — CASE COMMUNICATION
"  ----- Message -----  From: Cecilia Villalta R.N.  Sent: 2/28/2022   8:43 AM PST  To: Dale Rodriguez R.N.      Quality Review Completed for 2/19 SOC OASIS by NIGEL Villalta, RN on 2/28/2022:  Edits completed by NIGEL Villalta RN:  1.  changed to 2/19 for LSOC ordered  2. Per narrative for homebound status \" needs assistance to leave home, needs assistive devices to ambulate, unsafe to drive or leave residence without assistance, unstea dy gait and weakness and fatigue \", changed the following for safety: , 1810, 1820, all #1, 1840 is #2, 1845 is #1,  is #5,  is #2,  is #3, patient ambulated 80' with PT, has a 3 in 1 and no grab bars for safety.  changed to 2.22 for collaboration  3.  changed to newly epithelialized due to surgical wounds healing by primary intention in the first 30 days do not granulate per the guideline  4.  changed  to 3, per guidelines when ambulation is with supervision only    5.  F is #1 per narrative for CG assist  6.  changed to 5 per therapy  7. [id number] is NA for stairs prior ability  8. [id number] changed to set up assist for B, C, F, supervision to G,   9. [id number] changed to supervision to D, E, F, I, per narrative for safety issues in HB status  10. Added ambulate only w/assist, proper med useand F2F to 485 forms  11. Resolved dupli maki Med management off care plan.    "

## 2022-03-02 NOTE — ASSESSMENT & PLAN NOTE
I am very pleased that the patient was able to have this operation.  She has a longstanding history of osteoarthritis bilaterally.  She is optimistic that she will be able to have the same operation on her right knee when she is recovered from this operation.    She is a highly motivated patient and has been working hard to lose weight.  She is disappointed that she has had some setbacks in her weight loss recently but I encouraged her that she is still highly motivated, and this will improve when she is more mobile again.  Work note completed.

## 2022-03-02 NOTE — CASE COMMUNICATION
"i agree to all changes made-LB  ----- Message -----  From: Cecilia Villalta R.N.  Sent: 2/28/2022   8:43 AM PST  To: Dale Rodriguez R.N.      Quality Review Completed for 2/19 SOC OASIS by NIGEL Villalta RN on 2/28/2022:  Edits completed by NIGEL Villalta RN:  1.  changed to 2/19 for LSOC ordered  2. Per narrative for homebound status \" needs assistance to leave home, needs assistive devices to ambulate, unsafe to drive or leave reside nce without assistance, unsteady gait and weakness and fatigue \", changed the following for safety: , 1810, 1820, all #1, 1840 is #2, 1845 is #1,  is #5,  is #2,  is #3, patient ambulated 80' with PT, has a 3 in 1 and no grab bars for safety.  changed to 2.22 for collaboration  3.  changed to newly epithelialized due to surgical wounds healing by primary intention in the first 30 days do not granulate per t he guideline  4.  changed to 3, per guidelines when ambulation is with supervision only    5.  F is #1 per narrative for CG assist  6.  changed to 5 per therapy  7. [id number] is NA for stairs prior ability  8. IR6503 changed to set up assist for B, C, F, supervision to G,   9. [id number] changed to supervision to D, E, F, I, per narrative for safety issues in HB status  10. Added ambulate only w/assist, proper med useand F2F to  485 forms  11. Resolved duplicate Med management off care plan.    "

## 2022-03-02 NOTE — PROGRESS NOTES
Subjective:     CC: Work note    HPI:   Violetta presents today with the above chief complaint    Problem   Abnormal Finding On Imaging of Liver    As part of the patient's preoperative work-up for her total knee arthroscopy arthroplasty, I ordered a chest x-ray on the patient at the request of her orthopedic surgeon.  There was a questionable mass on x-ray, and so I followed up with a chest CT.  The lung fields were clear but there was an incidental finding on the visualized liver with dilation of the hepatic vessels suggestive of chronic hepatic vein thrombosis.  Also possibility of fatty liver versus cirrhosis.     Arthritis of Left Knee    Patient presents for follow-up visit following left total knee arthroplasty 2 weeks ago with Dr. Vela.  She was just seen by him in his office today for follow-up visit with him, and they removed her staples.    She states that she still some swelling as well as pain but this is gradually improving.  Today she requests that I complete a work note on her behalf with a return date of May 10, 2022 with permission to use a cane as an assistive device.             Current Outpatient Medications Ordered in Epic   Medication Sig Dispense Refill   • traMADol (ULTRAM) 50 MG Tab TAKE 1 TO 2 TABLETS BY MOUTH EVERY 8 HOURS AS NEEDED FOR MODERATE PAIN. FOR 14 DAYS     • ferrous gluconate (FERGON) 324 (38 Fe) MG Tab Take 324 mg by mouth 2 times a day.     • beclomethasone HFA (QVAR REDIHALER) 80 MCG/ACT inhaler Inhale 1 Puff 2 times a day. for asthma, RX at home     • albuterol (PROVENTIL) 2.5mg/3ml Nebu Soln solution for nebulization Take 2.5 mg by nebulization every 6 hours as needed for Shortness of Breath. for asthma, RX at home     • Albuterol Sulfate, sensor, 108 (90 Base) MCG/ACT AEROSOL POWDER, BREATH ACTIVATED Inhale 2 Puffs every 6 hours as needed (wheexing, asthma). RX at home     • aspirin (ASA) 81 MG Chew Tab chewable tablet Chew 162 mg 2 times a day. order at home for blood clot  "prevention     • acetaminophen (TYLENOL) 500 MG Tab Take 1,000 mg by mouth every 6 hours as needed for Mild Pain.     • ibuprofen (MOTRIN) 200 MG Tab Take 400 mg by mouth every 6 hours as needed.     • dicyclomine (BENTYL) 10 MG Cap Take 1 Capsule by mouth 3 times a day. 90 Capsule 3   • docusate sodium (COLACE) 100 MG Cap Take 1 Capsule by mouth 2 times a day as needed for Constipation. 60 Capsule 12   • fluticasone (FLONASE) 50 MCG/ACT nasal spray Administer 1 Spray into affected nostril(S) every day. 16 g 12   • Respiratory Therapy Supplies (NEBULIZER) Device 1 Units by Does not apply route every 6 hours. 1 Device 0   • oxyCODONE immediate-release (ROXICODONE) 5 MG Tab TAKE 1 TABLET EVERY 4-6 HOURS BY ORAL ROUTE FOR 10 DAYS. (Patient not taking: Reported on 3/1/2022)     • meloxicam (MOBIC) 7.5 MG Tab  (Patient not taking: Reported on 3/1/2022)       No current [facility]-ordered facility-administered medications on file.       Health Maintenance: deferred    ROS:  Gen: no fevers/chills, no changes in weight  Eyes: no changes in vision  ENT: no sore throat, no hearing loss, no rhinorrhea  Pulm: no sob, no cough  CV: no chest pain, no palpitations  GI: no nausea/vomiting, no diarrhea  : no dysuria  MSk: see HPI  Skin: no rash  Neuro: no headaches, no numbness/tingling  Heme/Lymph: no easy bruising      Objective:     Exam:  /81   Pulse (!) 104   Ht 1.6 m (5' 3\")   Wt 103 kg (227 lb)   SpO2 91%   BMI 40.21 kg/m²  Body mass index is 40.21 kg/m².    Gen: Alert and oriented, No apparent distress. Obese.  Neck: Neck is supple without lymphadenopathy.  Lungs: Normal effort, CTA bilaterally, no wheezes, rhonchi, or rales  CV: Regular rate and rhythm. No murmurs, rubs, or gallops.  Ext: There is a 30 cm linear incision over the anterior left lower extremity centered around the knee.  It appears well-healing without erythema or discharge.  There are multiple Steri-Strips in place.  There is diffuse, mild to moderate " edema of the left lower extremity from the knee to the ankle.  No clubbing, cyanosis, or other edema.        Assessment & Plan:     56 y.o. female with the following -     Problem List Items Addressed This Visit     Arthritis of left knee     I am very pleased that the patient was able to have this operation.  She has a longstanding history of osteoarthritis bilaterally.  She is optimistic that she will be able to have the same operation on her right knee when she is recovered from this operation.    She is a highly motivated patient and has been working hard to lose weight.  She is disappointed that she has had some setbacks in her weight loss recently but I encouraged her that she is still highly motivated, and this will improve when she is more mobile again.  Work note completed.             Relevant Medications    oxyCODONE immediate-release (ROXICODONE) 5 MG Tab    traMADol (ULTRAM) 50 MG Tab    Abnormal finding on imaging of liver      Unclear what this represents.  The patient's most recent CMP as part of her preoperative work-up had no abnormalities from a hepatic standpoint.  I have ordered a right upper quadrant ultrasound on the patient's behalf to further evaluate.         Relevant Orders    US-RUQ              No follow-ups on file.  Follow-up to be determined based on results of liver ultrasound.

## 2022-03-02 NOTE — ASSESSMENT & PLAN NOTE
Unclear what this represents.  The patient's most recent CMP as part of her preoperative work-up had no abnormalities from a hepatic standpoint.  I have ordered a right upper quadrant ultrasound on the patient's behalf to further evaluate.

## 2022-03-03 ENCOUNTER — HOME CARE VISIT (OUTPATIENT)
Dept: HOME HEALTH SERVICES | Facility: HOME HEALTHCARE | Age: 57
End: 2022-03-03
Payer: MEDICAID

## 2022-03-03 VITALS
TEMPERATURE: 98.1 F | SYSTOLIC BLOOD PRESSURE: 140 MMHG | OXYGEN SATURATION: 94 % | RESPIRATION RATE: 16 BRPM | DIASTOLIC BLOOD PRESSURE: 68 MMHG | HEART RATE: 96 BPM

## 2022-03-03 PROCEDURE — G0151 HHCP-SERV OF PT,EA 15 MIN: HCPCS

## 2022-03-04 ENCOUNTER — HOME CARE VISIT (OUTPATIENT)
Dept: HOME HEALTH SERVICES | Facility: HOME HEALTHCARE | Age: 57
End: 2022-03-04
Payer: MEDICAID

## 2022-03-04 VITALS
SYSTOLIC BLOOD PRESSURE: 134 MMHG | DIASTOLIC BLOOD PRESSURE: 72 MMHG | TEMPERATURE: 98.4 F | OXYGEN SATURATION: 94 % | HEART RATE: 103 BPM | RESPIRATION RATE: 16 BRPM

## 2022-03-04 PROCEDURE — G0151 HHCP-SERV OF PT,EA 15 MIN: HCPCS

## 2022-03-05 ASSESSMENT — ENCOUNTER SYMPTOMS
HIGHEST PAIN SEVERITY IN PAST 24 HOURS: 4/10
PERSON REPORTING PAIN: PATIENT
PAIN LOCATION - EXACERBATING FACTORS: END RANGES OF MOTION
MENTAL STATUS CHANGE: 0
PAIN SEVERITY GOAL: 0/10
PAIN LOCATION - PAIN SEVERITY: 5/10
HIGHEST PAIN SEVERITY IN PAST 24 HOURS: 6/10
PAIN LOCATION: LEFT KNEE
PAIN LOCATION - PAIN FREQUENCY: FREQUENT
PAIN: 1
PAIN LOCATION - PAIN FREQUENCY: FREQUENT
PERSON REPORTING PAIN: PATIENT
PAIN: 1
SUBJECTIVE PAIN PROGRESSION: GRADUALLY IMPROVING
MENTAL STATUS CHANGE: 0
PERSON REPORTING PAIN: PATIENT
LOWEST PAIN SEVERITY IN PAST 24 HOURS: 1/10
PAIN LOCATION - PAIN DURATION: SINCE SURGERY
PAIN SEVERITY GOAL: 0/10
PAIN LOCATION - PAIN FREQUENCY: FREQUENT
SUBJECTIVE PAIN PROGRESSION: GRADUALLY IMPROVING
PAIN LOCATION - PAIN SEVERITY: 4/10
PAIN SEVERITY GOAL: 2/10
PAIN LOCATION: LEFT KNEE
PAIN LOCATION - PAIN DURATION: SINCE SURGERY
PAIN: 1
PAIN LOCATION - PAIN SEVERITY: 3/10
SUBJECTIVE PAIN PROGRESSION: WAXING AND WANING
PAIN LOCATION - PAIN DURATION: SINCE SURGERY
PAIN LOCATION - EXACERBATING FACTORS: END RANGE FLEXION
HIGHEST PAIN SEVERITY IN PAST 24 HOURS: 5/10
PAIN LOCATION: LEFT KNEE
MENTAL STATUS CHANGE: 0
LOWEST PAIN SEVERITY IN PAST 24 HOURS: 2/10
LOWEST PAIN SEVERITY IN PAST 24 HOURS: 4/10

## 2022-03-05 ASSESSMENT — ACTIVITIES OF DAILY LIVING (ADL)
AMBULATION ASSISTANCE ON FLAT SURFACES: 1
AMBULATION ASSISTANCE ON UNEVEN SURFACES: 1
AMBULATION ASSISTANCE ON FLAT SURFACES: 1
HOME_HEALTH_OASIS: 00
AMBULATION ASSISTANCE ON FLAT SURFACES: 1
AMBULATION_DISTANCE/DURATION_TOLERATED: 2 X 25 FT

## 2022-03-05 ASSESSMENT — PATIENT HEALTH QUESTIONNAIRE - PHQ9: CLINICAL INTERPRETATION OF PHQ2 SCORE: 0

## 2022-03-08 ENCOUNTER — HOME CARE VISIT (OUTPATIENT)
Dept: HOME HEALTH SERVICES | Facility: HOME HEALTHCARE | Age: 57
End: 2022-03-08
Payer: MEDICAID

## 2022-03-08 ASSESSMENT — ACTIVITIES OF DAILY LIVING (ADL): OASIS_M1830: 04

## 2022-03-08 NOTE — CASE COMMUNICATION
Therapist agrees with changes. JHONNY  ----- Message -----  From: Cecilia Villalta R.N.  Sent: 3/8/2022   6:39 AM PST  To: Daxa Lamar, PT      Quality Review Completed for DC OASIS by NIGEL Villalta, RN on 3/8/2022:  Edits completed by NIGEL Villalta, RN:  1.  DM foot care is NA  2.  changed to #4, patient now ambulates >250' with a walker and can access bathing area  3.  changed to 0 per 3/3 PT note making gains in activity t olerance. On 3/4 note, >250' independently w/4ww.   4.  changed to daily not constant, per DC narrative pain is controlled with medications and rest

## 2022-03-08 NOTE — CASE COMMUNICATION
Quality Review Completed for DC OASIS by NIGEL Villalta, RN on 3/8/2022:  Edits completed by NIGEL Villalta RN:  1.  DM foot care is NA  2.  changed to #4, patient now ambulates >250' with a walker and can access bathing area  3.  changed to 0 per 3/3 PT note making gains in activity tolerance. On 3/4 note, >250' independently w/4ww.   4.  changed to daily not constant, per DC narrative pain is controlled with medications and  rest

## 2022-03-15 ENCOUNTER — HOSPITAL ENCOUNTER (OUTPATIENT)
Dept: RADIOLOGY | Facility: MEDICAL CENTER | Age: 57
End: 2022-03-15
Attending: STUDENT IN AN ORGANIZED HEALTH CARE EDUCATION/TRAINING PROGRAM
Payer: MEDICAID

## 2022-03-15 DIAGNOSIS — R93.2 ABNORMAL FINDING ON IMAGING OF LIVER: ICD-10-CM

## 2022-03-15 PROCEDURE — 76705 ECHO EXAM OF ABDOMEN: CPT

## 2022-03-16 PROCEDURE — G0180 MD CERTIFICATION HHA PATIENT: HCPCS | Performed by: FAMILY MEDICINE

## 2022-03-23 DIAGNOSIS — K58.9 IRRITABLE BOWEL SYNDROME, UNSPECIFIED TYPE: ICD-10-CM

## 2022-03-24 RX ORDER — DICYCLOMINE HYDROCHLORIDE 10 MG/1
10 CAPSULE ORAL 3 TIMES DAILY
Qty: 90 CAPSULE | Refills: 3 | Status: SHIPPED | OUTPATIENT
Start: 2022-03-24 | End: 2022-07-25

## 2022-03-25 ENCOUNTER — TELEPHONE (OUTPATIENT)
Dept: MEDICAL GROUP | Facility: CLINIC | Age: 57
End: 2022-03-25
Payer: MEDICAID

## 2022-03-25 NOTE — TELEPHONE ENCOUNTER
Phone Number Called: [phone]    Call outcome: Did not leave a detailed message. Requested patient to call back.    Message: Requested pt call back to review lab results below

## 2022-03-25 NOTE — TELEPHONE ENCOUNTER
----- Message from Christina Philip M.D. sent at 3/18/2022 10:07 AM PDT -----  Please let the patient know that I received the results of her right upper quadrant ultrasound.  Her liver is enlarged with some changes that suggest fatty liver.  I know that the patient is already very motivated and is working on weight loss.  I expect this to improve with further weight loss.  She does have a cyst in the liver that appears benign and does not require any further evaluation.  There is no evidence of any dilation of the hepatic artery.  Blood flow appears normal.  Please have her call the clinic if she has any questions.  Or schedule an appointment

## 2022-06-20 ENCOUNTER — TELEPHONE (OUTPATIENT)
Dept: MEDICAL GROUP | Facility: CLINIC | Age: 57
End: 2022-06-20
Payer: MEDICAID

## 2022-06-20 NOTE — TELEPHONE ENCOUNTER
Dr. Philip your patient Violetta Rodriguez just called and wants you to know Her right knee surgery is with Dr. Vela July 19th. And tomarrow she has appt the 21st for [employer] disability for testing exercise and they will send you a copy of results but pt did not know the name of place.  Also she needs refill Nebulizer Provental  And albuterol Sulfate inhaler. Thank Dr. Tamera Sebastian

## 2022-07-01 NOTE — TELEPHONE ENCOUNTER
Received request via: Patient    Was the patient seen in the last year in this department? Yes    Does the patient have an active prescription (recently filled or refills available) for medication(s) requested? No     Patient is out of her nebulizer solution and needs an ASAP refill.

## 2022-07-05 RX ORDER — ALBUTEROL SULFATE 2.5 MG/3ML
2.5 SOLUTION RESPIRATORY (INHALATION) EVERY 6 HOURS PRN
Qty: 1 EACH | Refills: 11 | Status: SHIPPED | OUTPATIENT
Start: 2022-07-05 | End: 2022-07-06 | Stop reason: SDUPTHER

## 2022-07-06 ENCOUNTER — OFFICE VISIT (OUTPATIENT)
Dept: MEDICAL GROUP | Facility: CLINIC | Age: 57
End: 2022-07-06
Payer: MEDICAID

## 2022-07-06 VITALS
OXYGEN SATURATION: 92 % | DIASTOLIC BLOOD PRESSURE: 77 MMHG | WEIGHT: 216.9 LBS | HEART RATE: 84 BPM | HEIGHT: 63 IN | SYSTOLIC BLOOD PRESSURE: 128 MMHG | BODY MASS INDEX: 38.43 KG/M2

## 2022-07-06 DIAGNOSIS — J45.909 UNCOMPLICATED ASTHMA, UNSPECIFIED ASTHMA SEVERITY, UNSPECIFIED WHETHER PERSISTENT: ICD-10-CM

## 2022-07-06 DIAGNOSIS — Z12.31 SCREENING MAMMOGRAM FOR BREAST CANCER: ICD-10-CM

## 2022-07-06 PROCEDURE — 99213 OFFICE O/P EST LOW 20 MIN: CPT | Mod: GE | Performed by: STUDENT IN AN ORGANIZED HEALTH CARE EDUCATION/TRAINING PROGRAM

## 2022-07-06 RX ORDER — FLUTICASONE PROPIONATE 50 MCG
1 SPRAY, SUSPENSION (ML) NASAL DAILY
Qty: 16 G | Refills: 12 | Status: SHIPPED | OUTPATIENT
Start: 2022-07-06

## 2022-07-06 RX ORDER — ALBUTEROL SULFATE 2.5 MG/3ML
2.5 SOLUTION RESPIRATORY (INHALATION) EVERY 6 HOURS PRN
Qty: 1 EACH | Refills: 11 | Status: SHIPPED | OUTPATIENT
Start: 2022-07-06

## 2022-07-06 NOTE — PROGRESS NOTES
"Subjective:     CC: Requesting surgical clearance, asthma medication refills    HPI:   Violetta presents today with     Requesting surgical clearance--  The patient has a long standing history of arthritis in her knees. She works as a  at [employer] and relies on her cane, walker, or using a cart to decrease her knee pain. She reports significant right knee swelling after working 9 hours. The patient only has minimal relief with motrin and tylenol. She also attended physical therapy with no relief in her symptoms. She underwent left knee replacement in February. She and her orthopedic surgeon have a plan to replace her right knee on July 19 for which she is requesting clearance.     Asthma medication refill--  The patient has a known history of asthma \"since birth.\" She reports good control of her asthma symptoms with flonase, albuterol, and beclomethasone. She reported an increase in her asthma symptoms as she has been out of albuterol for one week and flonase. She is requesting a refill of her albuterol and flonase.      Current Outpatient Medications Ordered in Epic   Medication Sig Dispense Refill   • fluticasone (FLONASE) 50 MCG/ACT nasal spray Administer 1 Spray into affected nostril(S) every day. 16 g 12   • albuterol (PROVENTIL) 2.5mg/3ml Nebu Soln solution for nebulization Take 3 mL by nebulization every 6 hours as needed for Shortness of Breath. for asthma, RX at home 1 Each 11   • dicyclomine (BENTYL) 10 MG Cap Take 1 Capsule by mouth 3 times a day. 90 Capsule 3   • ferrous gluconate (FERGON) 324 (38 Fe) MG Tab Take 324 mg by mouth 2 times a day.     • beclomethasone HFA (QVAR REDIHALER) 80 MCG/ACT inhaler Inhale 1 Puff 2 times a day. for asthma, RX at home     • acetaminophen (TYLENOL) 500 MG Tab Take 1,000 mg by mouth every 6 hours as needed for Mild Pain.     • ibuprofen (MOTRIN) 200 MG Tab Take 400 mg by mouth every 6 hours as needed.     • Respiratory Therapy Supplies (NEBULIZER) Device 1 Units by " "Does not apply route every 6 hours. 1 Device 0     No current Epic-ordered facility-administered medications on file.       Health Maintenance:   - Her previous mammogram was 11/21, new order placed today  - Her previous colonscopy was < 5 years ago, she requests to discuss next colonscopy with Dr. Philip.  - She denied tobacco use, recreational drug use, and alcohol use.     ROS negative unless stated in HPI.    Objective:     Exam:  /77 (BP Location: Right arm, Patient Position: Sitting, BP Cuff Size: Large adult)   Pulse 84   Ht 1.6 m (5' 3\")   Wt 98.4 kg (216 lb 14.4 oz)   SpO2 92%   BMI 38.42 kg/m²  Body mass index is 38.42 kg/m².    Physical Exam   General: Awake, alert, and oriented. Ambulates with cane.  Resp: Speaking in full and complete sentences, no increased work of breathing, CTAB  CV: RRR, no m/r/g  Ex: Well healed surgical incision present on left anterior knee.     Labs: No results found for this or any previous visit.    Assessment & Plan:     57 y.o. female with the following -     #Osteoarthritis  - The patient has a plan with her orthopedic surgeon to undergo right knee replacement this month, she is requesting surgical clearance.  - The patient is below average risk for series complications as per the ACS calculator as her risk level is 2.4%. It is reasonable to proceed to surgery.   - The aforementioned statement was sent to Dr. Vela's office via fax today.     #Asthma  - As per the patient's request, I will refill the patient's albuterol and flonase as she reports adequate control of her asthma symptoms with the current regiment.     Return if symptoms worsen or fail to improve.            "

## 2022-07-08 ENCOUNTER — HOSPITAL ENCOUNTER (OUTPATIENT)
Dept: RADIOLOGY | Facility: MEDICAL CENTER | Age: 57
End: 2022-07-08
Payer: MEDICAID

## 2022-07-11 ENCOUNTER — TELEPHONE (OUTPATIENT)
Dept: MEDICAL GROUP | Facility: CLINIC | Age: 57
End: 2022-07-11
Payer: MEDICAID

## 2022-07-11 DIAGNOSIS — J45.20 MILD INTERMITTENT ASTHMA WITHOUT COMPLICATION: ICD-10-CM

## 2022-07-11 NOTE — TELEPHONE ENCOUNTER
The patient called needing a new prescription for her tubing for her nebulizer because it is a yearly prescription that needs to be refilled. She said the prescription/order can be faxed over to [facility]  [phone]

## 2022-07-23 DIAGNOSIS — K58.9 IRRITABLE BOWEL SYNDROME, UNSPECIFIED TYPE: ICD-10-CM

## 2022-07-25 RX ORDER — DICYCLOMINE HYDROCHLORIDE 10 MG/1
CAPSULE ORAL
Qty: 90 CAPSULE | Refills: 3 | Status: SHIPPED | OUTPATIENT
Start: 2022-07-25 | End: 2022-11-28

## 2022-08-29 ENCOUNTER — TELEPHONE (OUTPATIENT)
Dept: MEDICAL GROUP | Facility: CLINIC | Age: 57
End: 2022-08-29
Payer: MEDICAID

## 2022-08-29 NOTE — TELEPHONE ENCOUNTER
Violetta called and states that she is experiencing itching and is wondering if it's related to medications that she was prescribed following a knee procedure by Ryan Vela.     I was unable to find either medication that she referred to (meloxican and trazodone) on her current medication list. I have advised the patient to contact Dr Vela's office to discuss her concerns with the prescribing physician.

## 2022-10-06 ENCOUNTER — HOSPITAL ENCOUNTER (OUTPATIENT)
Dept: RADIOLOGY | Facility: MEDICAL CENTER | Age: 57
End: 2022-10-06
Attending: STUDENT IN AN ORGANIZED HEALTH CARE EDUCATION/TRAINING PROGRAM
Payer: MEDICAID

## 2022-10-06 ENCOUNTER — OFFICE VISIT (OUTPATIENT)
Dept: MEDICAL GROUP | Facility: CLINIC | Age: 57
End: 2022-10-06
Payer: MEDICAID

## 2022-10-06 DIAGNOSIS — R21 RASH: ICD-10-CM

## 2022-10-06 DIAGNOSIS — M25.50 ARTHRALGIA, UNSPECIFIED JOINT: ICD-10-CM

## 2022-10-06 DIAGNOSIS — Z12.12 SCREENING FOR COLORECTAL CANCER: ICD-10-CM

## 2022-10-06 DIAGNOSIS — Z23 NEED FOR VACCINATION: ICD-10-CM

## 2022-10-06 DIAGNOSIS — Z11.59 NEED FOR HEPATITIS C SCREENING TEST: ICD-10-CM

## 2022-10-06 DIAGNOSIS — Z12.11 SCREENING FOR COLORECTAL CANCER: ICD-10-CM

## 2022-10-06 PROCEDURE — 90677 PCV20 VACCINE IM: CPT | Performed by: STUDENT IN AN ORGANIZED HEALTH CARE EDUCATION/TRAINING PROGRAM

## 2022-10-06 PROCEDURE — 73030 X-RAY EXAM OF SHOULDER: CPT | Mod: RT

## 2022-10-06 PROCEDURE — 73030 X-RAY EXAM OF SHOULDER: CPT | Mod: LT

## 2022-10-06 PROCEDURE — 90471 IMMUNIZATION ADMIN: CPT | Performed by: STUDENT IN AN ORGANIZED HEALTH CARE EDUCATION/TRAINING PROGRAM

## 2022-10-06 PROCEDURE — 73080 X-RAY EXAM OF ELBOW: CPT | Mod: LT

## 2022-10-06 PROCEDURE — 99214 OFFICE O/P EST MOD 30 MIN: CPT | Mod: 25,EP,GC | Performed by: STUDENT IN AN ORGANIZED HEALTH CARE EDUCATION/TRAINING PROGRAM

## 2022-10-06 RX ORDER — TRAMADOL HYDROCHLORIDE 50 MG/1
TABLET ORAL
COMMUNITY
Start: 2022-07-11

## 2022-10-06 RX ORDER — MELOXICAM 15 MG/1
15 TABLET ORAL
COMMUNITY
Start: 2022-09-14

## 2022-10-06 RX ORDER — TRIAMCINOLONE ACETONIDE 0.25 MG/G
1 CREAM TOPICAL 2 TIMES DAILY
Qty: 80 G | Refills: 2 | Status: SHIPPED | OUTPATIENT
Start: 2022-10-06

## 2022-10-06 RX ORDER — OXYCODONE HYDROCHLORIDE 5 MG/1
TABLET ORAL
COMMUNITY
Start: 2022-07-25

## 2022-10-06 RX ORDER — DOCUSATE SODIUM 100 MG/1
CAPSULE, LIQUID FILLED ORAL
COMMUNITY
Start: 2022-09-25 | End: 2023-01-25 | Stop reason: SDUPTHER

## 2022-10-06 RX ORDER — ONDANSETRON 4 MG/1
TABLET, ORALLY DISINTEGRATING ORAL
COMMUNITY
Start: 2022-07-11

## 2022-10-06 NOTE — PROGRESS NOTES
Subjective:     CC: Rash, x-rays    HPI:   Violetta presents today with the above chief complaints.    Problem   Rash    Took meloxicam and tramadol to recover from knee replacement surgeries. Noticed an itchy rash to back and neck. Unsure which medication it was but stoppped these medications, and it has not fully resolved but has improved gradually. Has tried hydrocortisone and benadryl. Some improvement.      Arthralgia    Pain bilateral shoulders, left shoulder and R wrist. Fatigue. Swelling in wrist. Present for some time but recently worsening (unclear timeline). Has had associated weakness b/l.  Has been following with orthopedic surgeon, Dr. Vela, for this.  He has examined her and is proceeding with further work-up.    Recently had NCT as ordered by Dr Vela. [place] physician suggested possible rheumatoid arthritis and labwork.     Also requests xrays L elbow and b/l shoulders. Talked with  and needs this to be ordered by [facility] doctor. Does have appt to f/u with Dr Vela in 6 days to follow results.          Current Outpatient Medications Ordered in Epic   Medication Sig Dispense Refill    triamcinolone acetonide (KENALOG) 0.025 % Cream Apply 1 Application topically 2 times a day. 80 g 2    dicyclomine (BENTYL) 10 MG Cap TAKE 1 CAPSULE BY MOUTH THREE TIMES A DAY 90 Capsule 3    fluticasone (FLONASE) 50 MCG/ACT nasal spray Administer 1 Spray into affected nostril(S) every day. 16 g 12    albuterol (PROVENTIL) 2.5mg/3ml Nebu Soln solution for nebulization Take 3 mL by nebulization every 6 hours as needed for Shortness of Breath. for asthma, RX at home 1 Each 11    ferrous gluconate (FERGON) 324 (38 Fe) MG Tab Take 324 mg by mouth 2 times a day.      beclomethasone HFA (QVAR REDIHALER) 80 MCG/ACT inhaler Inhale 1 Puff 2 times a day. for asthma, RX at home      acetaminophen (TYLENOL) 500 MG Tab Take 1,000 mg by mouth every 6 hours as needed for Mild Pain.      Respiratory Therapy Supplies  "(NEBULIZER) Device 1 Units by Does not apply route every 6 hours. 1 Device 0    docusate sodium (COLACE) 100 MG Cap TAKE 1 CAPSULE BY MOUTH 2 TIMES A DAY AS NEEDED FOR CONSTIPATION (Patient not taking: Reported on 10/6/2022)      meloxicam (MOBIC) 15 MG tablet Take 15 mg by mouth every day. (Patient not taking: Reported on 10/6/2022)      ondansetron (ZOFRAN ODT) 4 MG TABLET DISPERSIBLE TAKE 1 TABLET BY MOUTH EVERY 12 HOURS AS NEEDED FOR NAUSEA AND VOMITING (Patient not taking: Reported on 10/6/2022)      oxyCODONE immediate-release (ROXICODONE) 5 MG Tab TAKE 1 TABLET BY MOUTH EVERY 4 TO 6 HOURS FOR 7 DAYS. G89.18 (Patient not taking: Reported on 10/6/2022)      traMADol (ULTRAM) 50 MG Tab TAKE 1 TO 2 TABLETS BY MOUTH EVERY 8 HOURS AS NEEDED FOR MODERATE PAIN FOR 10 DAYS. G89.18 (Patient not taking: Reported on 10/6/2022)      ibuprofen (MOTRIN) 200 MG Tab Take 400 mg by mouth every 6 hours as needed. (Patient not taking: Reported on 10/6/2022)       No current [facility]-ordered facility-administered medications on file.       Health Maintenance: Deferred    ROS:  Gen: no fevers/chills; positive fatigue; recent postoperative weight gain but is working on losing weight again (reports maximum weight 260 pounds last year)  Eyes: no changes in vision  ENT: no sore throat, no hearing loss, no rhinorrhea  Pulm: no sob, no cough  CV: no chest pain, no palpitations  GI: no nausea/vomiting, no diarrhea  : no dysuria  MSk: See HPI  Skin: See HPI  Neuro: Chronic mild carpal tunnel; no headaches, no worsening of numbness/tingling  Heme/Lymph: no easy bruising      Objective:     Exam:  BP (!) (P) 153/97 (BP Location: Left arm, Patient Position: Sitting, BP Cuff Size: Large adult)   Pulse (P) 88   Ht (P) 1.585 m (5' 2.4\")   Wt (P) 93.8 kg (206 lb 12.8 oz)   SpO2 (P) 93%   BMI (P) 37.34 kg/m²  Body mass index is 37.34 kg/m² (pended).    Gen: Alert and oriented, No apparent distress.  Neck: Neck is supple without " lymphadenopathy.  Lungs: Normal effort, CTA bilaterally, no wheezes, rhonchi, or rales  CV: Regular rate and rhythm. No murmurs, rubs, or gallops.  Ext: There are 2, longitudinal, well-healed anterior medial surgical incisions to bilateral knees.  There is no sign of dehiscence.  There is no obvious edema, calor or rubor to the area.  There is additionally tenderness to palpation over the left lateral medialis with mild edema at that area without any calor or rubor.  There is no fluctuance.  There are 2 pulsatile, 1 cm x 0.3 cm masses just distal and proximal to the right radial wrist.  There is no thrill.  There is no overlying skin change.  Distal capillary refill is brisk and there is no sensory change in this area.  No clubbing, cyanosis, other edema.        Assessment & Plan:     57 y.o. female with the following -     Problem List Items Addressed This Visit       Rash     Rash not impressive on exam today.  Unclear etiology.  Still symptomatic with pruritus.    Continue benadryl as needed, add non-drowsy such as claritin. Agree with discontinuation of possibly offending medications; patient no longer taking. Try triamcinolone. Counseled on risk of skin thinning, skin bleaching, sparing use. To RTC if worsening, not resolving or changing character.          Relevant Medications    triamcinolone acetonide (KENALOG) 0.025 % Cream    Arthralgia     Ordered x-rays as requested by Dr. Vela and patient.  Note in orders to forward to Dr. Vela for his review, as he is working up the patient for these issues.  I have also ordered a basic rheumatology work-up for the patient, and will call her when I get the results. Patient understands that I will contact her with results, whether normal or abnormal.  If I do not contact the patient, this means that it did not receive the results, and if the patient has had the test(s) performed, she should call our clinic to further inquire.            Relevant Orders    CCP ANTIBODY     RHEUMATOID ARTHRITIS FACTOR    Sed Rate    CRP QUANTITIVE (NON-CARDIAC)    DX-ELBOW-COMPLETE 3+ LEFT    DX-SHOULDER 2+ LEFT    DX-SHOULDER 2+ RIGHT     Other Visit Diagnoses       Need for hepatitis C screening test        Relevant Orders    HCV Scrn ( [id number] 1xLife)    Need for vaccination        Relevant Orders    Pneumococcal Conjugate Vaccine 20-Valent (19 yrs+) (Completed)    Screening for colorectal cancer        Relevant Orders    Referral to GI for Colonoscopy                No follow-ups on file.  Patient to call office of any difficulty with further work-up with Dr. Vela.

## 2022-10-06 NOTE — LETTER
October 6, 2022        Violetta Divonace Michael:    Please allow the patient to return to work.  She has fully recovered from both of her surgeries, and is ambulating well without any assistive devices.  She is able to tolerate exercise and does not anticipate any requirement for modifications.  However, please allow her to have sitting or rest breaks as needed, though we do not suspect she will need these frequently if at all.    Sincerely,           MD Christina Garibay M.D.

## 2022-10-06 NOTE — ASSESSMENT & PLAN NOTE
Ordered x-rays as requested by Dr. Vela and patient.  Note in orders to forward to Dr. Vela for his review, as he is working up the patient for these issues.  I have also ordered a basic rheumatology work-up for the patient, and will call her when I get the results. Patient understands that I will contact her with results, whether normal or abnormal.  If I do not contact the patient, this means that it did not receive the results, and if the patient has had the test(s) performed, she should call our clinic to further inquire.

## 2022-10-06 NOTE — ASSESSMENT & PLAN NOTE
Rash not impressive on exam today.  Unclear etiology.  Still symptomatic with pruritus.    Continue benadryl as needed, add non-drowsy such as claritin. Agree with discontinuation of possibly offending medications; patient no longer taking. Try triamcinolone. Counseled on risk of skin thinning, skin bleaching, sparing use. To RTC if worsening, not resolving or changing character.

## 2022-10-11 ENCOUNTER — PATIENT MESSAGE (OUTPATIENT)
Dept: MEDICAL GROUP | Facility: CLINIC | Age: 57
End: 2022-10-11
Payer: MEDICAID

## 2022-11-01 ENCOUNTER — HOSPITAL ENCOUNTER (OUTPATIENT)
Dept: RADIOLOGY | Facility: MEDICAL CENTER | Age: 57
End: 2022-11-01
Attending: EMERGENCY MEDICINE
Payer: MEDICAID

## 2022-11-01 DIAGNOSIS — Z02.71 ISSUE OF INCAPACITY CERTIFICATE: ICD-10-CM

## 2022-11-01 PROCEDURE — 73030 X-RAY EXAM OF SHOULDER: CPT | Mod: RT

## 2022-11-01 PROCEDURE — 72040 X-RAY EXAM NECK SPINE 2-3 VW: CPT

## 2022-11-01 PROCEDURE — 73030 X-RAY EXAM OF SHOULDER: CPT | Mod: LT

## 2022-11-14 ENCOUNTER — HOSPITAL ENCOUNTER (OUTPATIENT)
Dept: RADIOLOGY | Facility: MEDICAL CENTER | Age: 57
End: 2022-11-14
Attending: STUDENT IN AN ORGANIZED HEALTH CARE EDUCATION/TRAINING PROGRAM
Payer: MEDICAID

## 2022-11-14 DIAGNOSIS — Z12.31 SCREENING MAMMOGRAM FOR BREAST CANCER: ICD-10-CM

## 2022-11-14 PROCEDURE — 77063 BREAST TOMOSYNTHESIS BI: CPT

## 2022-11-23 DIAGNOSIS — K58.9 IRRITABLE BOWEL SYNDROME, UNSPECIFIED TYPE: ICD-10-CM

## 2022-11-28 RX ORDER — DICYCLOMINE HYDROCHLORIDE 10 MG/1
CAPSULE ORAL
Qty: 90 CAPSULE | Refills: 3 | Status: SHIPPED | OUTPATIENT
Start: 2022-11-28 | End: 2023-03-22

## 2023-01-25 RX ORDER — DOCUSATE SODIUM 100 MG/1
CAPSULE, LIQUID FILLED ORAL
Qty: 60 CAPSULE | Refills: 2 | Status: SHIPPED | OUTPATIENT
Start: 2023-01-25

## 2023-01-25 NOTE — TELEPHONE ENCOUNTER
Received request via: Patient    Was the patient seen in the last year in this department? Yes    Does the patient have an active prescription (recently filled or refills available) for medication(s) requested?  Requesting refill    Does the patient have [facility]PC Plus and need 100 day supply (blood pressure, diabetes and cholesterol meds only)? Patient does not have SCP

## 2023-03-18 DIAGNOSIS — K58.9 IRRITABLE BOWEL SYNDROME, UNSPECIFIED TYPE: ICD-10-CM

## 2023-03-22 RX ORDER — DICYCLOMINE HYDROCHLORIDE 10 MG/1
CAPSULE ORAL
Qty: 90 CAPSULE | Refills: 1 | Status: SHIPPED | OUTPATIENT
Start: 2023-03-22